# Patient Record
Sex: MALE | Race: WHITE | URBAN - METROPOLITAN AREA
[De-identification: names, ages, dates, MRNs, and addresses within clinical notes are randomized per-mention and may not be internally consistent; named-entity substitution may affect disease eponyms.]

---

## 2017-03-24 ENCOUNTER — APPOINTMENT (OUTPATIENT)
Dept: CT IMAGING | Facility: CLINIC | Age: 47
End: 2017-03-24
Attending: EMERGENCY MEDICINE
Payer: OTHER MISCELLANEOUS

## 2017-03-24 ENCOUNTER — APPOINTMENT (OUTPATIENT)
Dept: GENERAL RADIOLOGY | Facility: CLINIC | Age: 47
End: 2017-03-24
Attending: EMERGENCY MEDICINE
Payer: OTHER MISCELLANEOUS

## 2017-03-24 ENCOUNTER — ANESTHESIA EVENT (OUTPATIENT)
Dept: SURGERY | Facility: CLINIC | Age: 47
End: 2017-03-24
Payer: OTHER MISCELLANEOUS

## 2017-03-24 ENCOUNTER — HOSPITAL ENCOUNTER (OUTPATIENT)
Facility: CLINIC | Age: 47
Setting detail: OBSERVATION
Discharge: HOME | End: 2017-03-25
Attending: EMERGENCY MEDICINE | Admitting: INTERNAL MEDICINE
Payer: OTHER MISCELLANEOUS

## 2017-03-24 ENCOUNTER — APPOINTMENT (OUTPATIENT)
Dept: CT IMAGING | Facility: CLINIC | Age: 47
End: 2017-03-24
Attending: PHYSICIAN ASSISTANT
Payer: OTHER MISCELLANEOUS

## 2017-03-24 ENCOUNTER — ANESTHESIA (OUTPATIENT)
Dept: SURGERY | Facility: CLINIC | Age: 47
End: 2017-03-24
Payer: OTHER MISCELLANEOUS

## 2017-03-24 DIAGNOSIS — S01.512A LACERATION OF ORAL CAVITY, INITIAL ENCOUNTER: ICD-10-CM

## 2017-03-24 DIAGNOSIS — S02.2XXA CLOSED FRACTURE OF NASAL BONE, INITIAL ENCOUNTER: ICD-10-CM

## 2017-03-24 DIAGNOSIS — S01.511A LIP LACERATION, INITIAL ENCOUNTER: ICD-10-CM

## 2017-03-24 DIAGNOSIS — S40.012A CONTUSION OF LEFT SHOULDER, INITIAL ENCOUNTER: ICD-10-CM

## 2017-03-24 PROBLEM — K13.79 MOUTH BLEEDING: Status: ACTIVE | Noted: 2017-03-24

## 2017-03-24 LAB
ANION GAP SERPL CALCULATED.3IONS-SCNC: 6 MMOL/L (ref 3–14)
BUN SERPL-MCNC: 15 MG/DL (ref 7–30)
CALCIUM SERPL-MCNC: 8.6 MG/DL (ref 8.5–10.1)
CHLORIDE SERPL-SCNC: 106 MMOL/L (ref 94–109)
CO2 SERPL-SCNC: 26 MMOL/L (ref 20–32)
CREAT SERPL-MCNC: 0.78 MG/DL (ref 0.66–1.25)
ERYTHROCYTE [DISTWIDTH] IN BLOOD BY AUTOMATED COUNT: 13.1 % (ref 10–15)
GFR SERPL CREATININE-BSD FRML MDRD: ABNORMAL ML/MIN/1.7M2
GLUCOSE SERPL-MCNC: 106 MG/DL (ref 70–99)
HCT VFR BLD AUTO: 44.5 % (ref 40–53)
HGB BLD-MCNC: 15.4 G/DL (ref 13.3–17.7)
MCH RBC QN AUTO: 30.2 PG (ref 26.5–33)
MCHC RBC AUTO-ENTMCNC: 34.6 G/DL (ref 31.5–36.5)
MCV RBC AUTO: 87 FL (ref 78–100)
PLATELET # BLD AUTO: 204 10E9/L (ref 150–450)
POTASSIUM SERPL-SCNC: 4.3 MMOL/L (ref 3.4–5.3)
RBC # BLD AUTO: 5.1 10E12/L (ref 4.4–5.9)
SODIUM SERPL-SCNC: 138 MMOL/L (ref 133–144)
WBC # BLD AUTO: 12.3 10E9/L (ref 4–11)

## 2017-03-24 PROCEDURE — 25000125 ZZHC RX 250: Performed by: NURSE ANESTHETIST, CERTIFIED REGISTERED

## 2017-03-24 PROCEDURE — 27210794 ZZH OR GENERAL SUPPLY STERILE: Performed by: DENTIST

## 2017-03-24 PROCEDURE — 96365 THER/PROPH/DIAG IV INF INIT: CPT

## 2017-03-24 PROCEDURE — 25800025 ZZH RX 258: Performed by: NURSE ANESTHETIST, CERTIFIED REGISTERED

## 2017-03-24 PROCEDURE — 71000012 ZZH RECOVERY PHASE 1 LEVEL 1 FIRST HR: Performed by: DENTIST

## 2017-03-24 PROCEDURE — 80048 BASIC METABOLIC PNL TOTAL CA: CPT | Performed by: EMERGENCY MEDICINE

## 2017-03-24 PROCEDURE — 90471 IMMUNIZATION ADMIN: CPT

## 2017-03-24 PROCEDURE — 36000050 ZZH SURGERY LEVEL 2 1ST 30 MIN: Performed by: DENTIST

## 2017-03-24 PROCEDURE — 70486 CT MAXILLOFACIAL W/O DYE: CPT

## 2017-03-24 PROCEDURE — 73030 X-RAY EXAM OF SHOULDER: CPT | Mod: LT

## 2017-03-24 PROCEDURE — 25000128 H RX IP 250 OP 636: Performed by: EMERGENCY MEDICINE

## 2017-03-24 PROCEDURE — 25000132 ZZH RX MED GY IP 250 OP 250 PS 637: Performed by: PHYSICIAN ASSISTANT

## 2017-03-24 PROCEDURE — G0378 HOSPITAL OBSERVATION PER HR: HCPCS

## 2017-03-24 PROCEDURE — 25000125 ZZHC RX 250: Performed by: ANESTHESIOLOGY

## 2017-03-24 PROCEDURE — 25000128 H RX IP 250 OP 636: Performed by: PHYSICIAN ASSISTANT

## 2017-03-24 PROCEDURE — 25000128 H RX IP 250 OP 636: Performed by: ANESTHESIOLOGY

## 2017-03-24 PROCEDURE — 37000009 ZZH ANESTHESIA TECHNICAL FEE, EACH ADDTL 15 MIN: Performed by: DENTIST

## 2017-03-24 PROCEDURE — 72125 CT NECK SPINE W/O DYE: CPT

## 2017-03-24 PROCEDURE — 85027 COMPLETE CBC AUTOMATED: CPT | Performed by: EMERGENCY MEDICINE

## 2017-03-24 PROCEDURE — 96375 TX/PRO/DX INJ NEW DRUG ADDON: CPT

## 2017-03-24 PROCEDURE — 25000125 ZZHC RX 250: Performed by: EMERGENCY MEDICINE

## 2017-03-24 PROCEDURE — 96361 HYDRATE IV INFUSION ADD-ON: CPT

## 2017-03-24 PROCEDURE — 25000566 ZZH SEVOFLURANE, EA 15 MIN: Performed by: DENTIST

## 2017-03-24 PROCEDURE — 99220 ZZC INITIAL OBSERVATION CARE,LEVL III: CPT | Performed by: PHYSICIAN ASSISTANT

## 2017-03-24 PROCEDURE — 40000169 ZZH STATISTIC PRE-PROCEDURE ASSESSMENT I: Performed by: DENTIST

## 2017-03-24 PROCEDURE — 25000128 H RX IP 250 OP 636: Performed by: NURSE ANESTHETIST, CERTIFIED REGISTERED

## 2017-03-24 PROCEDURE — 71000013 ZZH RECOVERY PHASE 1 LEVEL 1 EA ADDTL HR: Performed by: DENTIST

## 2017-03-24 PROCEDURE — 37000008 ZZH ANESTHESIA TECHNICAL FEE, 1ST 30 MIN: Performed by: DENTIST

## 2017-03-24 PROCEDURE — 70450 CT HEAD/BRAIN W/O DYE: CPT

## 2017-03-24 PROCEDURE — 36000052 ZZH SURGERY LEVEL 2 EA 15 ADDTL MIN: Performed by: DENTIST

## 2017-03-24 PROCEDURE — 96376 TX/PRO/DX INJ SAME DRUG ADON: CPT

## 2017-03-24 PROCEDURE — 99285 EMERGENCY DEPT VISIT HI MDM: CPT | Mod: 25

## 2017-03-24 PROCEDURE — 25000125 ZZHC RX 250: Performed by: DENTIST

## 2017-03-24 PROCEDURE — 90715 TDAP VACCINE 7 YRS/> IM: CPT | Performed by: EMERGENCY MEDICINE

## 2017-03-24 RX ORDER — FENTANYL CITRATE 50 UG/ML
INJECTION, SOLUTION INTRAMUSCULAR; INTRAVENOUS PRN
Status: DISCONTINUED | OUTPATIENT
Start: 2017-03-24 | End: 2017-03-24

## 2017-03-24 RX ORDER — OXYCODONE HYDROCHLORIDE 5 MG/1
5-10 TABLET ORAL
Status: CANCELLED | OUTPATIENT
Start: 2017-03-24

## 2017-03-24 RX ORDER — SODIUM CHLORIDE 9 MG/ML
INJECTION, SOLUTION INTRAVENOUS ONCE
Status: COMPLETED | OUTPATIENT
Start: 2017-03-24 | End: 2017-03-24

## 2017-03-24 RX ORDER — CEFAZOLIN SODIUM 1 G/3ML
1 INJECTION, POWDER, FOR SOLUTION INTRAMUSCULAR; INTRAVENOUS EVERY 8 HOURS
Status: DISCONTINUED | OUTPATIENT
Start: 2017-03-24 | End: 2017-03-25 | Stop reason: HOSPADM

## 2017-03-24 RX ORDER — ONDANSETRON 4 MG/1
4 TABLET, ORALLY DISINTEGRATING ORAL EVERY 30 MIN PRN
Status: DISCONTINUED | OUTPATIENT
Start: 2017-03-24 | End: 2017-03-24 | Stop reason: HOSPADM

## 2017-03-24 RX ORDER — ACETAMINOPHEN 650 MG/1
650 SUPPOSITORY RECTAL EVERY 4 HOURS PRN
Status: DISCONTINUED | OUTPATIENT
Start: 2017-03-24 | End: 2017-03-25 | Stop reason: HOSPADM

## 2017-03-24 RX ORDER — FENTANYL CITRATE 0.05 MG/ML
25-50 INJECTION, SOLUTION INTRAMUSCULAR; INTRAVENOUS
Status: DISCONTINUED | OUTPATIENT
Start: 2017-03-24 | End: 2017-03-24 | Stop reason: HOSPADM

## 2017-03-24 RX ORDER — MEPERIDINE HYDROCHLORIDE 25 MG/ML
12.5 INJECTION INTRAMUSCULAR; INTRAVENOUS; SUBCUTANEOUS EVERY 5 MIN PRN
Status: DISCONTINUED | OUTPATIENT
Start: 2017-03-24 | End: 2017-03-24 | Stop reason: HOSPADM

## 2017-03-24 RX ORDER — LIDOCAINE HYDROCHLORIDE AND EPINEPHRINE BITARTRATE 20; .01 MG/ML; MG/ML
INJECTION, SOLUTION SUBCUTANEOUS PRN
Status: DISCONTINUED | OUTPATIENT
Start: 2017-03-24 | End: 2017-03-24 | Stop reason: HOSPADM

## 2017-03-24 RX ORDER — TRAMADOL HYDROCHLORIDE 50 MG/1
50 TABLET ORAL EVERY 6 HOURS PRN
Status: DISCONTINUED | OUTPATIENT
Start: 2017-03-24 | End: 2017-03-25 | Stop reason: HOSPADM

## 2017-03-24 RX ORDER — HYDROMORPHONE HYDROCHLORIDE 1 MG/ML
0.2 INJECTION, SOLUTION INTRAMUSCULAR; INTRAVENOUS; SUBCUTANEOUS
Status: DISCONTINUED | OUTPATIENT
Start: 2017-03-24 | End: 2017-03-25 | Stop reason: HOSPADM

## 2017-03-24 RX ORDER — DEXAMETHASONE SODIUM PHOSPHATE 4 MG/ML
INJECTION, SOLUTION INTRA-ARTICULAR; INTRALESIONAL; INTRAMUSCULAR; INTRAVENOUS; SOFT TISSUE PRN
Status: DISCONTINUED | OUTPATIENT
Start: 2017-03-24 | End: 2017-03-24

## 2017-03-24 RX ORDER — LIDOCAINE HYDROCHLORIDE 20 MG/ML
INJECTION, SOLUTION INFILTRATION; PERINEURAL PRN
Status: DISCONTINUED | OUTPATIENT
Start: 2017-03-24 | End: 2017-03-24

## 2017-03-24 RX ORDER — ACETAMINOPHEN 325 MG/1
650 TABLET ORAL EVERY 4 HOURS PRN
Status: DISCONTINUED | OUTPATIENT
Start: 2017-03-24 | End: 2017-03-25 | Stop reason: HOSPADM

## 2017-03-24 RX ORDER — ONDANSETRON 2 MG/ML
4 INJECTION INTRAMUSCULAR; INTRAVENOUS EVERY 30 MIN PRN
Status: DISCONTINUED | OUTPATIENT
Start: 2017-03-24 | End: 2017-03-24 | Stop reason: HOSPADM

## 2017-03-24 RX ORDER — PROPOFOL 10 MG/ML
INJECTION, EMULSION INTRAVENOUS PRN
Status: DISCONTINUED | OUTPATIENT
Start: 2017-03-24 | End: 2017-03-24

## 2017-03-24 RX ORDER — ONDANSETRON 4 MG/1
4 TABLET, ORALLY DISINTEGRATING ORAL EVERY 6 HOURS PRN
Status: DISCONTINUED | OUTPATIENT
Start: 2017-03-24 | End: 2017-03-25 | Stop reason: HOSPADM

## 2017-03-24 RX ORDER — SODIUM CHLORIDE 9 MG/ML
INJECTION, SOLUTION INTRAVENOUS CONTINUOUS
Status: DISCONTINUED | OUTPATIENT
Start: 2017-03-24 | End: 2017-03-25 | Stop reason: HOSPADM

## 2017-03-24 RX ORDER — ONDANSETRON 2 MG/ML
4 INJECTION INTRAMUSCULAR; INTRAVENOUS EVERY 6 HOURS PRN
Status: DISCONTINUED | OUTPATIENT
Start: 2017-03-24 | End: 2017-03-25 | Stop reason: HOSPADM

## 2017-03-24 RX ORDER — NALOXONE HYDROCHLORIDE 0.4 MG/ML
.1-.4 INJECTION, SOLUTION INTRAMUSCULAR; INTRAVENOUS; SUBCUTANEOUS
Status: DISCONTINUED | OUTPATIENT
Start: 2017-03-24 | End: 2017-03-25 | Stop reason: HOSPADM

## 2017-03-24 RX ORDER — BUPIVACAINE HYDROCHLORIDE AND EPINEPHRINE 5; 5 MG/ML; UG/ML
INJECTION, SOLUTION PERINEURAL PRN
Status: DISCONTINUED | OUTPATIENT
Start: 2017-03-24 | End: 2017-03-24 | Stop reason: HOSPADM

## 2017-03-24 RX ORDER — SODIUM CHLORIDE, SODIUM LACTATE, POTASSIUM CHLORIDE, CALCIUM CHLORIDE 600; 310; 30; 20 MG/100ML; MG/100ML; MG/100ML; MG/100ML
INJECTION, SOLUTION INTRAVENOUS CONTINUOUS
Status: DISCONTINUED | OUTPATIENT
Start: 2017-03-24 | End: 2017-03-24

## 2017-03-24 RX ORDER — EPHEDRINE SULFATE 50 MG/ML
INJECTION, SOLUTION INTRAMUSCULAR; INTRAVENOUS; SUBCUTANEOUS PRN
Status: DISCONTINUED | OUTPATIENT
Start: 2017-03-24 | End: 2017-03-24

## 2017-03-24 RX ORDER — SODIUM CHLORIDE, SODIUM LACTATE, POTASSIUM CHLORIDE, CALCIUM CHLORIDE 600; 310; 30; 20 MG/100ML; MG/100ML; MG/100ML; MG/100ML
INJECTION, SOLUTION INTRAVENOUS CONTINUOUS PRN
Status: DISCONTINUED | OUTPATIENT
Start: 2017-03-24 | End: 2017-03-24

## 2017-03-24 RX ORDER — HYDROMORPHONE HYDROCHLORIDE 1 MG/ML
0.5 INJECTION, SOLUTION INTRAMUSCULAR; INTRAVENOUS; SUBCUTANEOUS
Status: DISCONTINUED | OUTPATIENT
Start: 2017-03-24 | End: 2017-03-24

## 2017-03-24 RX ORDER — CEFAZOLIN SODIUM 1 G/3ML
1 INJECTION, POWDER, FOR SOLUTION INTRAMUSCULAR; INTRAVENOUS ONCE
Status: COMPLETED | OUTPATIENT
Start: 2017-03-24 | End: 2017-03-24

## 2017-03-24 RX ORDER — ONDANSETRON 2 MG/ML
INJECTION INTRAMUSCULAR; INTRAVENOUS PRN
Status: DISCONTINUED | OUTPATIENT
Start: 2017-03-24 | End: 2017-03-24

## 2017-03-24 RX ORDER — CYCLOBENZAPRINE HCL 10 MG
10 TABLET ORAL 3 TIMES DAILY PRN
Status: DISCONTINUED | OUTPATIENT
Start: 2017-03-24 | End: 2017-03-25 | Stop reason: HOSPADM

## 2017-03-24 RX ORDER — MORPHINE SULFATE 4 MG/ML
4 INJECTION, SOLUTION INTRAMUSCULAR; INTRAVENOUS ONCE
Status: COMPLETED | OUTPATIENT
Start: 2017-03-24 | End: 2017-03-24

## 2017-03-24 RX ADMIN — CEFAZOLIN SODIUM 1 G: 1 INJECTION, POWDER, FOR SOLUTION INTRAMUSCULAR; INTRAVENOUS at 21:36

## 2017-03-24 RX ADMIN — HYDROMORPHONE HYDROCHLORIDE 0.5 MG: 1 INJECTION, SOLUTION INTRAMUSCULAR; INTRAVENOUS; SUBCUTANEOUS at 12:29

## 2017-03-24 RX ADMIN — CLOSTRIDIUM TETANI TOXOID ANTIGEN (FORMALDEHYDE INACTIVATED), CORYNEBACTERIUM DIPHTHERIAE TOXOID ANTIGEN (FORMALDEHYDE INACTIVATED), BORDETELLA PERTUSSIS TOXOID ANTIGEN (GLUTARALDEHYDE INACTIVATED), BORDETELLA PERTUSSIS FILAMENTOUS HEMAGGLUTININ ANTIGEN (FORMALDEHYDE INACTIVATED), BORDETELLA PERTUSSIS PERTACTIN ANTIGEN, AND BORDETELLA PERTUSSIS FIMBRIAE 2/3 ANTIGEN 0.5 ML: 5; 2; 2.5; 5; 3; 5 INJECTION, SUSPENSION INTRAMUSCULAR at 12:27

## 2017-03-24 RX ADMIN — SUCCINYLCHOLINE CHLORIDE 100 MG: 20 INJECTION, SOLUTION INTRAMUSCULAR; INTRAVENOUS at 16:32

## 2017-03-24 RX ADMIN — LIDOCAINE HYDROCHLORIDE 100 MG: 20 INJECTION, SOLUTION INFILTRATION; PERINEURAL at 16:32

## 2017-03-24 RX ADMIN — HYDROMORPHONE HYDROCHLORIDE 0.5 MG: 1 INJECTION, SOLUTION INTRAMUSCULAR; INTRAVENOUS; SUBCUTANEOUS at 11:36

## 2017-03-24 RX ADMIN — FENTANYL CITRATE 50 MCG: 50 INJECTION, SOLUTION INTRAMUSCULAR; INTRAVENOUS at 19:16

## 2017-03-24 RX ADMIN — HYDROMORPHONE HYDROCHLORIDE 0.2 MG: 1 INJECTION, SOLUTION INTRAMUSCULAR; INTRAVENOUS; SUBCUTANEOUS at 21:34

## 2017-03-24 RX ADMIN — PHENYLEPHRINE HYDROCHLORIDE 100 MCG: 10 INJECTION, SOLUTION INTRAMUSCULAR; INTRAVENOUS; SUBCUTANEOUS at 17:09

## 2017-03-24 RX ADMIN — MIDAZOLAM HYDROCHLORIDE 2 MG: 1 INJECTION, SOLUTION INTRAMUSCULAR; INTRAVENOUS at 16:29

## 2017-03-24 RX ADMIN — HYDROMORPHONE HYDROCHLORIDE 0.5 MG: 1 INJECTION, SOLUTION INTRAMUSCULAR; INTRAVENOUS; SUBCUTANEOUS at 19:16

## 2017-03-24 RX ADMIN — FENTANYL CITRATE 100 MCG: 50 INJECTION, SOLUTION INTRAMUSCULAR; INTRAVENOUS at 16:32

## 2017-03-24 RX ADMIN — ACETAMINOPHEN 650 MG: 325 TABLET, FILM COATED ORAL at 21:35

## 2017-03-24 RX ADMIN — PHENYLEPHRINE HYDROCHLORIDE 100 MCG: 10 INJECTION, SOLUTION INTRAMUSCULAR; INTRAVENOUS; SUBCUTANEOUS at 17:00

## 2017-03-24 RX ADMIN — DEXAMETHASONE SODIUM PHOSPHATE 4 MG: 4 INJECTION, SOLUTION INTRAMUSCULAR; INTRAVENOUS at 16:45

## 2017-03-24 RX ADMIN — Medication 5 MG: at 17:09

## 2017-03-24 RX ADMIN — PHENYLEPHRINE HYDROCHLORIDE 100 MCG: 10 INJECTION, SOLUTION INTRAMUSCULAR; INTRAVENOUS; SUBCUTANEOUS at 16:55

## 2017-03-24 RX ADMIN — SODIUM CHLORIDE: 9 INJECTION, SOLUTION INTRAVENOUS at 11:32

## 2017-03-24 RX ADMIN — MORPHINE SULFATE 4 MG: 4 INJECTION, SOLUTION INTRAMUSCULAR; INTRAVENOUS at 13:05

## 2017-03-24 RX ADMIN — FENTANYL CITRATE 150 MCG: 50 INJECTION, SOLUTION INTRAMUSCULAR; INTRAVENOUS at 16:49

## 2017-03-24 RX ADMIN — PHENYLEPHRINE HYDROCHLORIDE 100 MCG: 10 INJECTION, SOLUTION INTRAMUSCULAR; INTRAVENOUS; SUBCUTANEOUS at 17:06

## 2017-03-24 RX ADMIN — Medication 5 MG: at 17:22

## 2017-03-24 RX ADMIN — SODIUM CHLORIDE, POTASSIUM CHLORIDE, SODIUM LACTATE AND CALCIUM CHLORIDE: 600; 310; 30; 20 INJECTION, SOLUTION INTRAVENOUS at 17:08

## 2017-03-24 RX ADMIN — TRAMADOL HYDROCHLORIDE 50 MG: 50 TABLET, COATED ORAL at 20:22

## 2017-03-24 RX ADMIN — ONDANSETRON 4 MG: 2 INJECTION INTRAMUSCULAR; INTRAVENOUS at 16:45

## 2017-03-24 RX ADMIN — CEFAZOLIN SODIUM 1 G: 1 INJECTION, POWDER, FOR SOLUTION INTRAMUSCULAR; INTRAVENOUS at 12:27

## 2017-03-24 RX ADMIN — PHENYLEPHRINE HYDROCHLORIDE 100 MCG: 10 INJECTION, SOLUTION INTRAMUSCULAR; INTRAVENOUS; SUBCUTANEOUS at 17:16

## 2017-03-24 RX ADMIN — CYCLOBENZAPRINE HYDROCHLORIDE 10 MG: 10 TABLET, FILM COATED ORAL at 21:35

## 2017-03-24 RX ADMIN — PHENYLEPHRINE HYDROCHLORIDE 100 MCG: 10 INJECTION, SOLUTION INTRAMUSCULAR; INTRAVENOUS; SUBCUTANEOUS at 17:22

## 2017-03-24 RX ADMIN — Medication 5 MG: at 17:16

## 2017-03-24 RX ADMIN — SODIUM CHLORIDE, POTASSIUM CHLORIDE, SODIUM LACTATE AND CALCIUM CHLORIDE: 600; 310; 30; 20 INJECTION, SOLUTION INTRAVENOUS at 15:54

## 2017-03-24 RX ADMIN — PROPOFOL 200 MG: 10 INJECTION, EMULSION INTRAVENOUS at 16:32

## 2017-03-24 RX ADMIN — PHENYLEPHRINE HYDROCHLORIDE 100 MCG: 10 INJECTION, SOLUTION INTRAMUSCULAR; INTRAVENOUS; SUBCUTANEOUS at 17:04

## 2017-03-24 ASSESSMENT — ENCOUNTER SYMPTOMS
NUMBNESS: 0
ABDOMINAL PAIN: 0
NECK PAIN: 1
HEADACHES: 0
WOUND: 1

## 2017-03-24 NOTE — IP AVS SNAPSHOT
[facility] Observation Unit    [address] [place] [id number]    Phone:  [phone]                                       After Visit Summary   3/24/2017    Patrick Cloud    MRN: [id number]           After Visit Summary Signature Page     I have received my discharge instructions, and my questions have been answered. I have discussed any challenges I see with this plan with the nurse or doctor.    ..........................................................................................................................................  Patient/Patient Representative Signature      ..........................................................................................................................................  Patient Representative Print Name and Relationship to Patient    ..................................................               ................................................  Date                                            Time    ..........................................................................................................................................  Reviewed by Signature/Title    ...................................................              ..............................................  Date                                                            Time

## 2017-03-24 NOTE — ANESTHESIA CARE TRANSFER NOTE
Patient: Patrick Cloud    Procedure(s):  REPAIR OF LIP LACERATIONS AND FACIAL LACERATIONS; EXAM UNDER ANESTHESIA ORAL - Wound Class: II-Clean Contaminated   - Wound Class: II-Clean Contaminated    Diagnosis: Facial lacerations  Diagnosis Additional Information: No value filed.    Anesthesia Type:   General, ETT, RSI     Note:  Airway :Face Mask  Patient transferred to:PACU  Comments: Transferred to PACU, spontaneous respirations, 10L oxygen via facemask.  All monitors and alarms on and functioning, VSS.  Patient awake, comfortable.  Report to PACU RN.      Vitals: (Last set prior to Anesthesia Care Transfer)    CRNA VITALS  3/24/2017 1737 - 3/24/2017 1812      3/24/2017             Pulse: 103    SpO2: 99 %    Resp Rate (set): 10                Electronically Signed By: CUCO Frank CRNA  March 24, 2017  6:12 PM

## 2017-03-24 NOTE — ANESTHESIA PREPROCEDURE EVALUATION
Anesthesia Evaluation     . Pt has not had prior anesthetic            ROS/MED HX    ENT/Pulmonary:     (+)other ENT- facial laceration. Most severe on interior of lower lip. Nasal fracture. No mandibular fracture. Minimal swelling, , . .   (-) sleep apnea   Neurologic:  - neg neurologic ROS     Cardiovascular:  - neg cardiovascular ROS       METS/Exercise Tolerance:     Hematologic:  - neg hematologic  ROS       Musculoskeletal:  - neg musculoskeletal ROS       GI/Hepatic:        (-) GERD   Renal/Genitourinary:  - ROS Renal section negative       Endo:  - neg endo ROS       Psychiatric:  - neg psychiatric ROS       Infectious Disease:         Malignancy:         Other:                     Physical Exam  Normal systems: cardiovascular, pulmonary and dental    Airway   Mallampati: II  TM distance: >3 FB  Neck ROM: full    Dental     Cardiovascular       Pulmonary                     Anesthesia Plan      History & Physical Review  History and physical reviewed and following examination; no interval change.    ASA Status:  1 emergent.    NPO Status:  > 8 hours    Plan for General, ETT and RSI with Intravenous induction. Maintenance will be Balanced.    PONV prophylaxis:  Ondansetron (or other 5HT-3) and Dexamethasone or Solumedrol  Additional equipment: Videolaryngoscope      Postoperative Care  Postoperative pain management:  IV analgesics.      Consents  Anesthetic plan, risks, benefits and alternatives discussed with:  Patient..            Procedure: Procedure(s):  REPAIR LACERATION FACE  Preop diagnosis: Facial lacerations    No Known Allergies  History reviewed. No pertinent past medical history.  History reviewed. No pertinent surgical history.  Prior to Admission medications    Not on File     Current Facility-Administered Medications Ordered in Epic   Medication Dose Route Frequency Last Rate Last Dose     [Auto Hold] HYDROmorphone (PF) (DILAUDID) injection 0.5 mg  0.5 mg Intravenous Q15 Min PRN   0.5 mg at  03/24/17 1229     No current [facility]-ordered outpatient prescriptions on file.     Wt Readings from Last 1 Encounters:   03/24/17 78.7 kg (173 lb 6.4 oz)     Temp Readings from Last 1 Encounters:   03/24/17 37.5  C (99.5  F) (Temporal)     BP Readings from Last 6 Encounters:   03/24/17 127/80     Pulse Readings from Last 4 Encounters:   No data found for Pulse     Resp Readings from Last 1 Encounters:   03/24/17 16     SpO2 Readings from Last 1 Encounters:   03/24/17 92%     Recent Labs   Lab Test  03/24/17   1130   NA  138   POTASSIUM  4.3   CHLORIDE  106   CO2  26   ANIONGAP  6   GLC  106*   BUN  15   CR  0.78   DANIAL  8.6     Recent Labs   Lab Test  03/24/17   1130   WBC  12.3*   HGB  15.4   PLT  204     No results for input(s): INR in the last 92288 hours.    Invalid input(s): APTT   RECENT LABS:   ECG:   ECHO:   CXR:                  .

## 2017-03-24 NOTE — ED PROVIDER NOTES
"  History     Chief Complaint:  Facial pain    HPI   Patrick Cloud is a 47 year old male who presents with facial pain. This morning, the patient was working when a 3/4 inch piece of plywood fell 10 feet and hit him in the head, face, and left shoulder. The patient did not fall or lose consciousness and he was wearing a hardhat during the incident. The patient was able to ambulate after the incident. The patient presents with abrasions to his face, laceration to his lower right lip and nose, nasal swelling, left shoulder pain, jaw pain, and neck pain. No headache. No numbness or tingling. No vision changes. No dental problems. No abdominal pain. The patient last ate a 0430 this morning.    Allergies:  No known drug allergies.     Medications:    The patient is currently on no regular medications.     Past Medical History:    History reviewed.  No significant past medical history.     Past Surgical History:    History reviewed. No pertinent past surgical history.    Family History:    History reviewed. No pertinent family history.    Social History:  Presents to the ED alone  Tobacco Use: negative  Alcohol Use: negative     Review of Systems   HENT: Negative for dental problem.    Gastrointestinal: Negative for abdominal pain.   Musculoskeletal: Positive for neck pain.        Positive for left shoulder pain  Positive for nose swelling  Positive for jaw pain   Skin: Positive for wound.   Neurological: Negative for numbness and headaches.   All other systems reviewed and are negative.    Physical Exam   First Vitals:  BP: 130/59  Heart Rate: 70  Temp: 98.1  F (36.7  C)  Resp: 14  Height: 172.7 cm (5' 8\")  Weight: 77.1 kg (170 lb)  SpO2: 98 %    Physical Exam  Nursing note and vitals reviewed.  Constitutional:  Appears well-developed and well-nourished, comfortable.   HENT:     Head:   A small abrasion and contusion on his forehead. No hemotympanum  Nose:    Markedly swollen nose with diffuse tenderness in the nose. No " septal hematoma but dried blood in both nares and swelling . Laceration under the right nares in the upper lip approximately 2 cm  Mouth/Throat:  Mucosa is moist. Right half of the lower lip there is a gaping laceration that extends down towards his chin. Lower lip laceration separates the gingival labial junction, with a lot of gray debris in wound. Right half of upper lip small laceration  Eyes:    Conjunctivae are normal.      Pupils are equal, round, and reactive to light.      Right eye exhibits no discharge. Left eye exhibits no discharge.      No scleral icterus.   Cardiovascular:  Normal rate, regular rhythm.      Normal heart sounds and intact distal pulses.       No murmur heard.  Pulmonary/Chest:  Effort normal and breath sounds normal. No respiratory distress.     No wheezes. No rales. No chest wall tenderness. No stridor.   Abdominal:   Soft. No distension and no mass. No tenderness.      No rebound and no guarding. No flank pain.  Musculoskeletal:  Normal range of motion.      No edema and no tenderness.                                       Neck supple, no midline cervical tenderness.   Neurological:   Alert and oriented to person, place, and year.      No cranial nerve deficit.      Exhibits normal muscle tone. Coordination normal.      GCS eye subscore is 4. GCS verbal subscore is 5.      GCS motor subscore is 6.   Skin:    Skin is warm and dry. No rash noted. No diaphoresis.      No erythema. No pallor.   Psychiatric:   Behavior is normal. Judgment and thought content normal.     Emergency Department Course   Imaging:  Radiographic findings were communicated with the patient who voiced understanding of the findings.    Maxillofacial CT w/o contrast  Moderately displaced fractures of the nasal bone  bilaterally and adjacent anterosuperior nasal septum.  Report per radiology.    Shoulder XR G/E 3 views, left  Negative.  Preliminary radiology read.      Head CT w/o contrast  Negative CT scan of the  head.  Report per radiology.    Laboratory:  BMP: glucose 106 (H), otherwise WNL (Creatinine 0.78)  CBC:  WBC 12.3 (H), HGB 15.4, , otherwise WNL    Interventions:  (1229) Dilaudid, 0.5 mg, IV injection  (1305) Normal Saline, 125 mL/hr, IV bolus  (1306) Ancef, 1 g, IV  (1227) Tdap, 0.5 mLs, IM  (1305) Morphine, 4 mg, IV injection    The patient's symptoms were improved with parenteral narcotics.    Emergency Department Course:  Nursing notes and vitals reviewed.  I performed an exam of the patient as documented above.  A peripheral IV was established. Blood was drawn from the patient. This was sent for laboratory testing, findings above.   The patient was sent for a maxillofacial CT, shoulder x-ray, and head CT while in the emergency department, findings above.   Findings and plan explained to the patient who consents to admission.   (1245) I discussed the patient with Dr. Weldon of oral surgery, who will admit the patient to a observation bed for further monitoring, evaluation, and treatment.  (1247) I spoke with Dr. Freitas, the patient's workers comp physician, about the patient.    Impression & Plan    Medical Decision Making:  Patient comes in after a piece of plywood fell on him. He was not knocked out, he was wearing his hardhat. He has a swollen nose, but no evidence of septal hematoma. Laceration to the upper and lower lip and a significant laceration inside of the lower lip that pulled the gingiva away from the lower lip. There is a lot of debris down in this wound. It looks like white powder, almost like concrete powder. He is awake and alert. He is appropriate. No neck tenderness. His shoulder was sore so I got an x-ray which was normal. Head CT and maxillofacial CT show a nasal bone fracture, but no mandibular fracture and his head CT was normal. I talked with Dr. Weldon from oral surgery and this patient needs to go to the OR to get this wound all cleaned out in his mouth and repaired. The other lip  laceration can be repaired in the OR as well. He was cleaned up as best as I could some wet saline gauze was placed over the wounds and ice pack to the nose. He was given a gram of Ancef IV, a tetanus booster, and IV fluids. Dilaudid was used initially for pain and then morphine. He will be kept NPO, last meal was 4:30 this morning. The patient is very healthy. We will admit him up on observation until he can go to the operating room in the next 4-5 hours. We will continue IV fluids. I will let him do some saline mouth rinses as needed. Incidentally, the work comp doctor did get permission for me to talk with him so I did give him my findings on the phone with regards to the lip laceration and the nasal fracture.    Diagnosis:    ICD-10-CM    1. Laceration of oral cavity, initial encounter S01.512A     Lower inner lip   2. Lip laceration, initial encounter S01.511A    3. Closed fracture of nasal bone, initial encounter S02.2XXA    4. Contusion of left shoulder, initial encounter S40.012A        Disposition:  Admit observation to Dr. Weldon, plan is surgical debridement and repair later this afternoon. Whether he stays overnight or not will depend upon Dr. Weldon s evaluation. Regarding the nasal fracture, Dr. Weldon can review that to see if anything needs to be done while he is asleep. At this point, the patient is comfortable; he will be kept NPO, IV fluids, pain management. He received Ancef here in the emergency room.     I, German Jack, am serving as a scribe on 3/24/2017 at 10:51 AM to personally document services performed by Dr. Caldera based on my observations and the provider's statements to me.        Dacia Caldera MD  03/24/17 1499

## 2017-03-24 NOTE — ED NOTES
"[facility]  ED Nurse Handoff Report    ED Chief complaint: Facial Pain (Plywood fell from ceiling onto head/face. Nasal injury, neck stiffness, jaw pain and left shoulder pain. )      ED Diagnosis:   Final diagnoses:   Laceration of oral cavity, initial encounter - Lower inner lip   Lip laceration, initial encounter   Closed fracture of nasal bone, initial encounter   Contusion of left shoulder, initial encounter       Code Status: Full Code    Allergies: No Known Allergies    Activity level:  Stand with Assist     Needed?: No    Isolation: No  Infection: Not Applicable    Bariatric?: No      Vital Signs:   Vitals:    03/24/17 1049 03/24/17 1245   BP: 130/59 123/66   Resp: 14    Temp: 98.1  F (36.7  C)    TempSrc: Oral    SpO2: 98% 95%   Weight: 77.1 kg (170 lb)    Height: 1.727 m (5' 8\")        Cardiac Rhythm: ,        Pain level: 0-10 Pain Scale: 5    Is this patient confused?: No    Patient Report: Initial Complaint: a piece of plywood fell on patients face causing several lacerations and pain in jaw, nose and left shoulder  Focused Assessment: Patient came in after accident at work. He has a large lip laceration, abrasion and slight swelling of left shoulder, nose is swollen and a small laceration of forehead at hairline. Inner lower lip is embedded with shards of concrete and will need surgical wash out and repair.   Tests Performed: jaw and head CT, left shoulder xray  Abnormal Results: nose is fractured  Treatments provided: pain meds, IV antibiotic and IV fluids    Family Comments: wife at bedside    OBS brochure/video discussed/provided to patient: Yes    ED Medications:   Medications   HYDROmorphone (PF) (DILAUDID) injection 0.5 mg (0.5 mg Intravenous Given 3/24/17 1229)   ceFAZolin (ANCEF) 1 g vial to attach to  ml bag for ADULT or 50 ml bag for PEDS (0 g Intravenous Stopped 3/24/17 1306)   0.9% sodium chloride infusion ( Intravenous Stopped 3/24/17 1305)   Tdap " (tetanus-diphtheria-acell pertussis) (ADACEL) injection 0.5 mL (0.5 mLs Intramuscular Given 3/24/17 1227)   morphine (PF) injection 4 mg (4 mg Intravenous Given 3/24/17 1305)       Drips infusing?:  No      ED NURSE PHONE NUMBER: [phone]

## 2017-03-24 NOTE — PHARMACY-ADMISSION MEDICATION HISTORY
Admission medication history interview status for the 3/24/2017  admission is complete. See EPIC admission navigator for prior to admission medications     Medication history source reliability:Good    Actions taken by pharmacist (provider contacted, etc): interviewed patient     Additional medication history information not noted on PTA med list : is on NO home meds, prescription, otc, herbal    Medication reconciliation/reorder completed by provider prior to medication history? No    Time spent in this activity: 5 min    Prior to Admission medications    Not on File

## 2017-03-24 NOTE — IP AVS SNAPSHOT
MRN:[id number]                      After Visit Summary   3/24/2017    Patrick Cloud    MRN: [id number]           Thank you!     Thank you for choosing [facility] for your care. Our goal is always to provide you with excellent care. Hearing back from our patients is one way we can continue to improve our services. Please take a few minutes to complete the written survey that you may receive in the mail after you visit with us. Thank you!        Patient Information     Date Of Birth          1970        About your hospital stay     You were admitted on:  March 24, 2017 You last received care in the:  [facility] Observation [facility]    You were discharged on:  March 25, 2017        Reason for your hospital stay       You were registered to observation following a work related injury.                  Who to Call     For medical emergencies, please call 911.  For non-urgent questions about your medical care, please call your primary care provider or clinic, None  For questions related to your surgery, please call your surgery clinic        Attending Provider     Provider Specialty    Dacia Caldera MD Emergency Medicine    Gualberto Weldon DDS Oral Surgery    Miroslava Syed DO Internal Medicine       Primary Care Provider    [facility]       [place]        After Care Instructions     Activity       Your activity upon discharge: activity as tolerated; no driving while on Narcotics.  You can not return to work until Wednesday 3/29/2017.            Diet       Follow this diet upon discharge:  Full liquid diet for 2-3 days then start mechanical soft diet                  Follow-up Appointments     Follow-up and recommended labs and tests        Follow up with primary care provider, [facility], within 7 days to evaluate medication change and for hospital follow-up and to set up referral for ENT.  No follow up labs or test are needed.  Follow up with oral  maxillary facial surgery per their recommendations.                  Further instructions from your care team         Full Liquid Diet  A full liquid diet is a middle step between a clear liquid diet and eating solid foods. A clear liquid diet allows only liquid you can see through. A full liquid diet allows thicker, liquid foods as listed below. The full liquid diet may be used before or after surgery or before some medical tests. It is easy to digest and leaves little food in the stomach and intestines.  A full liquid diet meets calorie and protein needs for your body with liquids only. It should not be used for more than 5 days. Your doctor or dietitian may also order high-protein, high-calorie liquid supplements for extra vitamins and minerals. You may include the following items on a full liquid diet:    Adults  Adults should drink a total of 2 to 3 quarts of liquid per day. It may be easier to drink small, frequent servings rather than a few large ones. Patients with severe kidney or heart disease can drink only limited amounts of fluids. Check with your doctor.    Beverages. Coffee, tea, cream, milk, milkshakes, fruit and vegetable juices, sodas, mineral water (plain or flavored), liquid gelatin, electrolyte replacement sport drinks.    Cereals and soups. Creamy, hot breakfast cereals (wheat or rice), pureed soups (including pureed meats, bland vegetables, and white potatoes), tomato puree.    Desserts. Gelatin, whipped topping, custard-style yogurt, pudding, custard, plain ice cream, sherbet, sorbet, popsicles, fruit juice bars.    Miscellaneous. Salt, mild-flavored seasonings, chocolate flavoring, gravy, margarine, sugar, syrup, jelly, honey, hard candy (to suck on).  Children  Follow your health care provider s instructions. Young children who are eating solid foods may tolerate the items listed above. Here are some precautions:    Don't give hard candies to young children. The candies may cause  choking.    Children under 1 year old. Do not give honey. It may cause illness.    Children under 2 years old. Ask the doctor if you should supplement your child s diet with oral rehydration solutions, which contain electrolytes. Oral rehydration solution is available at drugstores and grocery stores without a prescription.    Children over 1 year old. Limit milk to 3 or 4 cups per day. Too much milk can decrease your child s appetite for other foods.    9195-7612 [employer]. [address] [place] [address]. All rights reserved. This information is not intended as a substitute for professional medical care. Always follow your healthcare professional's instructions.        Soft Diet  Your health care provider has prescribed a soft diet (also called gastrointestinal soft diet). This means eating foods that are soft in texture, low in fiber, and easily digested. This diet is for people with digestive problems. A soft diet provides foods that are easy to chew and swallow. Foods should be bite-size and very soft or moist so they are easy to swallow. Follow any specific instructions from your health care provider about foods and beverages you can and cannot have. The general guidelines below can help you get started on this diet.       Beverages  OK: Milk, tea, coffee, fruit juices, carbonated beverages, nutrition shakes and drinks (Note: Thin liquids might be difficult to swallow and may require thickening.)   Avoid: None, except if they need to be thickened  Breads and crackers  OK: Refined white, wheat, or seedless rye bread, maik or soda crackers that have been moistened, plain rolls or bagels, very soft tortillas  Avoid: Whole-grain breads, rolls, or bagels with nuts, raisins, or seeds, crackers, croutons, taco shells  Cereals and grains  OK: Cooked cereals, plain dry cereals that have been moistened, plain macaroni, spaghetti, noodles, rice  Avoid: Whole-grain cereals and granola, or  cereals containing bran, raisins, seeds or nuts, coconut; brown or wild rice  Fruits  OK: Avocado, banana, baked peeled apple, applesauce, peeled ripe peaches or pears, canned fruit (apricots, cherries, peaches, pears), melons  Avoid: Raw apple, dried fruits, coconut, pineapple, grapes, fruit ilzzie, fruit snacks  Meat and fish  OK: All fresh meat, poultry, or fish that is cooked until tender  Avoid: Meat, fish, or poultry that is fried; tough or stringy meat including de león, sausage, bratwurst, jerky, corned beef  Eggs and cheese  OK: Poached or scrambled eggs, cottage cheese, ricotta cheese, cream cheese, cheese sauces, or cheese melted in other dishes  Avoid: Crisp fried eggs, cheese slices and cubes  Other protein foods  OK: Tofu, baked beans, smooth peanut butter or other nut or seed butters  Avoid: Deep-fried tofu, crunchy peanut or other nut or seed butters, nuts or seeds that are whole or chopped  Soups  OK: All soups, but they may need to be thickened if the thin liquid is too difficult to swallow  Avoid: Soups made with stringy meat pieces or chunky vegetables  Vegetables  OK: Peeled and well-cooked potatoes or sweet potatoes; fresh, cooked, canned, or frozen vegetables without seeds, skin, or coarse fiber  Avoid: Raw vegetables, deep-fried vegetables (such as tempura); corn  Desserts and sweets  OK: Moist cake, soft fruit pie with bottom crust only, soft cookies moistened in milk or other liquid, gelatin, custard, pudding, plain ice cream, plain sherbet, sugar, honey, clear jelly  Avoid: Pastries, desserts, and ice cream that have nuts, coconut, seeds, or dried fruit; popcorn, chips of any kind including potato and taco chips; jam, marmalade    4877-2014 [employer]. [address] [place] [address], [place] [address]. All rights reserved. This information is not intended as a substitute for professional medical care. Always follow your healthcare professional's instructions.              Pending  "Results     No orders found for last 3 day(s).            Statement of Approval     Ordered          17 0936  I have reviewed and agree with all the recommendations and orders detailed in this document.  EFFECTIVE NOW     Approved and electronically signed by:  Edward Trammell PA-C             Admission Information     Date & Time Provider Department Dept. Phone    3/24/2017 Miroslava Syed DO [facility] [phone]      Your Vitals Were     Blood Pressure Temperature Respirations Height Weight Pulse Oximetry    104/63 (BP Location: Right arm) 97.5  F (36.4  C) (Oral) 16 1.727 m (5' 8\") 78.7 kg (173 lb 6.4 oz) 97%    BMI (Body Mass Index)                   26.37 kg/m2           [employer] Information     [employer] lets you send messages to your doctor, view your test results, renew your prescriptions, schedule appointments and more. To sign up, go to [email or web address] . Click on \"Log in\" on the left side of the screen, which will take you to the Welcome page. Then click on \"Sign up Now\" on the right side of the page.     You will be asked to enter the access code listed below, as well as some personal information. Please follow the directions to create your username and password.     Your access code is: [email or web address]  Expires: 2017 10:26 AM     Your access code will  in 90 days. If you need help or a new code, please call your [place] clinic or [phone].        Care EveryWhere ID     This is your Care EveryWhere ID. This could be used by other organizations to access your [place] medical records  [id number]           Review of your medicines      START taking        Dose / Directions    acetaminophen 325 MG tablet   Commonly known as:  TYLENOL   Used for:  Laceration of oral cavity, initial encounter, Lip laceration, initial encounter, Closed fracture of nasal bone, initial encounter, Contusion of left shoulder, initial encounter        Dose:  650 mg "   Take 2 tablets (650 mg) by mouth every 4 hours as needed for mild pain   Quantity:  100 tablet   Refills:  0       amoxicillin-clavulanate 875-125 MG per tablet   Commonly known as:  AUGMENTIN   Used for:  Laceration of oral cavity, initial encounter, Lip laceration, initial encounter, Closed fracture of nasal bone, initial encounter        Dose:  1 tablet   Take 1 tablet by mouth 2 times daily for 10 days   Quantity:  20 tablet   Refills:  0       cyclobenzaprine 10 MG tablet   Commonly known as:  FLEXERIL   Used for:  Laceration of oral cavity, initial encounter, Lip laceration, initial encounter, Closed fracture of nasal bone, initial encounter, Contusion of left shoulder, initial encounter        Dose:  10 mg   Take 1 tablet (10 mg) by mouth 3 times daily as needed for muscle spasms   Quantity:  60 tablet   Refills:  0       traMADol 50 MG tablet   Commonly known as:  ULTRAM   Used for:  Closed fracture of nasal bone, initial encounter, Lip laceration, initial encounter, Laceration of oral cavity, initial encounter, Contusion of left shoulder, initial encounter        Dose:  50 mg   Take 1 tablet (50 mg) by mouth every 6 hours as needed   Quantity:  25 tablet   Refills:  0            Where to get your medicines      These medications were sent to [facility] [place], [place] - [address]  [address] [place] [address], [place] [address]     Phone:  [phone]     amoxicillin-clavulanate 875-125 MG per tablet    cyclobenzaprine 10 MG tablet         Some of these will need a paper prescription and others can be bought over the counter. Ask your nurse if you have questions.     Bring a paper prescription for each of these medications     traMADol 50 MG tablet       You don't need a prescription for these medications     acetaminophen 325 MG tablet                Protect others around you: Learn how to safely use, store and throw away your medicines at www.disposemymeds.org.             Medication  List: This is a list of all your medications and when to take them. Check marks below indicate your daily home schedule. Keep this list as a reference.      Medications           Morning Afternoon Evening Bedtime As Needed    acetaminophen 325 MG tablet   Commonly known as:  TYLENOL   Take 2 tablets (650 mg) by mouth every 4 hours as needed for mild pain   Last time this was given:  650 mg on 3/25/2017 10:30 AM                                amoxicillin-clavulanate 875-125 MG per tablet   Commonly known as:  AUGMENTIN   Take 1 tablet by mouth 2 times daily for 10 days                                cyclobenzaprine 10 MG tablet   Commonly known as:  FLEXERIL   Take 1 tablet (10 mg) by mouth 3 times daily as needed for muscle spasms   Last time this was given:  10 mg on 3/24/2017  9:35 PM                                traMADol 50 MG tablet   Commonly known as:  ULTRAM   Take 1 tablet (50 mg) by mouth every 6 hours as needed   Last time this was given:  50 mg on 3/25/2017  7:37 AM

## 2017-03-25 VITALS
SYSTOLIC BLOOD PRESSURE: 104 MMHG | RESPIRATION RATE: 16 BRPM | OXYGEN SATURATION: 97 % | BODY MASS INDEX: 26.28 KG/M2 | DIASTOLIC BLOOD PRESSURE: 63 MMHG | WEIGHT: 173.4 LBS | HEIGHT: 68 IN | TEMPERATURE: 97.5 F

## 2017-03-25 LAB
ERYTHROCYTE [DISTWIDTH] IN BLOOD BY AUTOMATED COUNT: 13.5 % (ref 10–15)
HCT VFR BLD AUTO: 41.6 % (ref 40–53)
HGB BLD-MCNC: 14 G/DL (ref 13.3–17.7)
MCH RBC QN AUTO: 30.1 PG (ref 26.5–33)
MCHC RBC AUTO-ENTMCNC: 33.7 G/DL (ref 31.5–36.5)
MCV RBC AUTO: 90 FL (ref 78–100)
PLATELET # BLD AUTO: 190 10E9/L (ref 150–450)
RBC # BLD AUTO: 4.65 10E12/L (ref 4.4–5.9)
WBC # BLD AUTO: 15.3 10E9/L (ref 4–11)

## 2017-03-25 PROCEDURE — 99217 ZZC OBSERVATION CARE DISCHARGE: CPT | Performed by: PHYSICIAN ASSISTANT

## 2017-03-25 PROCEDURE — 85027 COMPLETE CBC AUTOMATED: CPT | Performed by: PHYSICIAN ASSISTANT

## 2017-03-25 PROCEDURE — 25000128 H RX IP 250 OP 636: Performed by: PHYSICIAN ASSISTANT

## 2017-03-25 PROCEDURE — 25000132 ZZH RX MED GY IP 250 OP 250 PS 637: Performed by: PHYSICIAN ASSISTANT

## 2017-03-25 PROCEDURE — G0378 HOSPITAL OBSERVATION PER HR: HCPCS

## 2017-03-25 PROCEDURE — 36415 COLL VENOUS BLD VENIPUNCTURE: CPT | Performed by: PHYSICIAN ASSISTANT

## 2017-03-25 RX ORDER — CYCLOBENZAPRINE HCL 10 MG
10 TABLET ORAL 3 TIMES DAILY PRN
Qty: 60 TABLET | Refills: 0 | Status: SHIPPED | OUTPATIENT
Start: 2017-03-25

## 2017-03-25 RX ORDER — TRAMADOL HYDROCHLORIDE 50 MG/1
50 TABLET ORAL EVERY 6 HOURS PRN
Qty: 25 TABLET | Refills: 0 | Status: SHIPPED | OUTPATIENT
Start: 2017-03-25

## 2017-03-25 RX ORDER — ACETAMINOPHEN 325 MG/1
650 TABLET ORAL EVERY 4 HOURS PRN
Qty: 100 TABLET | Refills: 0 | COMMUNITY
Start: 2017-03-25

## 2017-03-25 RX ADMIN — TRAMADOL HYDROCHLORIDE 50 MG: 50 TABLET, COATED ORAL at 07:37

## 2017-03-25 RX ADMIN — ACETAMINOPHEN 650 MG: 325 TABLET, FILM COATED ORAL at 10:30

## 2017-03-25 RX ADMIN — CEFAZOLIN SODIUM 1 G: 1 INJECTION, POWDER, FOR SOLUTION INTRAMUSCULAR; INTRAVENOUS at 05:37

## 2017-03-25 RX ADMIN — HYDROMORPHONE HYDROCHLORIDE 0.2 MG: 1 INJECTION, SOLUTION INTRAMUSCULAR; INTRAVENOUS; SUBCUTANEOUS at 07:35

## 2017-03-25 NOTE — PROGRESS NOTES
D/C pharmacy will speak w/ pt: Needing more info on pt's employer to process D/C Meds thru Workman's comp. Called wife and wife will call D/C pharmacy Directly to assist w/ having meds filled prior to d/c.    Orquidea Shine Care Transitions Nurse,  RN, BSN, [facility]  Cell Phone # [phone]

## 2017-03-25 NOTE — PROGRESS NOTES
ORAL AND MAXILLOFACIAL SURGERY PROGRESS NOTE    Patrick was seen on AM rounds, resting comfortably in bed, eating breakfast, wife at bedside.  He is POD #1 s/p debridement and primary closure of a right lower lip laceration.  Patient also has stable nasal bone fracture that does not require intervention.  He reports no acute issues overnight.  Pain is controlled.  Patient is tolerating po diet.      Maxillofacial Exam:  Abrasions hemostatic and clean  Nasal dorsum displaced slightly to the left, stable  Lower lip laceration is closed primarily, hemostatic and clean  Sutures are intact  No signs of acute postoperative infection    Assessment/Plan: Good progress for POD #1.  Anticipate pain and swelling to lower lip to continue 1-2 days and then improve.  Patient may be DC to home as per primary team.  Continue antibiotics while admitted and DC to home with course of Keflex or Augmentin for coverage of skin and intraoral terrell.  Patient will follow up with Dr. Weldon on Wednesday.  Please page OMS for concerns.    Mauricio Borja DDS

## 2017-03-25 NOTE — H&P
PRIMARY CARE PROVIDER:  [facility].      CHIEF COMPLAINT:  Facial lacerations.        HISTORY OF PRESENT ILLNESS:  Patrick Cloud is a 47-year-old male with a significant past medical history who presented to the Emergency Department this morning for evaluation of facial trauma.  He was at work when a piece of plywood fell 10 feet and hit him in the face.  He sustained multiple facial lacerations and presented to the Emergency Department.  Workup in the Emergency Department revealed a moderately displaced fracture of the nasal bone as well as multiple facial lacerations.  Oral Maxillofacial Surgery was consulted and he was taken to the OR for facial and lip laceration repair.  Hospitalist Service was asked to admit the patient postoperatively.      Presently, the patient is evaluated in his hospital room.  He offers few complaints at this time.  He is tolerating a full liquid diet.  He has had some progressive neck pain since the incident.  Denies any radicular symptoms.  Has full range of motion of neck.  He was wearing a hard hat.  He did not lose consciousness.      PAST MEDICAL HISTORY:  None.       PRIOR TO ADMISSION MEDICATIONS:  None.      SURGICAL HISTORY:  Nose fracture.      FAMILY HISTORY:  Heart disease.      SOCIAL HISTORY:  He is a nonsmoker, drinks minimal alcohol.  Is .        REVIEW OF SYSTEMS:  A 10-point review of systems was completed.  Pertinent positives are noted in HPI otherwise systems negative.      PHYSICAL EXAMINATION:   GENERAL:  This is a well-developed, well-nourished 47-year-old male who appears younger than his stated age.   VITAL SIGNS:  Blood pressure is 124/74, heart rate 78, respiration 16, O2 sat 96% on room air.   HEENT:  Normocephalic.  Multiple facial and lip lacerations noted.  Significant swelling to the oral mucosa as well as deformity of the nasal bones.   NECK:  Supple, full active range of motion.  No midline tenderness.   CARDIOVASCULAR:  Regular  rate and rhythm.  No murmurs.   PULMONARY:  Normal effort.  Clear to auscultation bilaterally.   ABDOMEN:  Nondistended.   EXTREMITIES:  Moves all 4 extremities.  Dorsalis pedis and radial pulses are palpated bilaterally.   NEUROLOGIC:  Alert and oriented.  Cranial nerves II-XII are intact.  Strength is 5/5 and symmetric in the upper extremities.  Radial pulses are palpated.      LABORATORY EVALUATION:  As reviewed in Epic.      ASSESSMENT:  Patrick Cloud is a 47-year-old male with no significant past medical history who was admitted following surgical repair of multiple facial and oral lacerations as well as wound debridement.   1.  Facial trauma with multiple facial lacerations, status post surgical debridement and repair.  The patient will be admitted under observation.  I have discussed with case with Dr. Weldon of Oral Maxillofacial Surgery.  He would like the patient initiated on Ancef 1 g q.8 h.  The patient will be on a full liquid diet.  He recommended pain control with acetaminophen and tramadol.  Zofran will also be available.  Warm water swish and spit every 2 hours as needed.  Dr. Weldon did tell me that his partner will see the patient tomorrow prior to discharge.   2.  Neck pain.  The patient did have significant trauma plywood hitting his head.  He has had progressive neck pain into this evening.  He has full range of motion of his neck and no radicular symptoms.  He was wearing a hard hat.  Given significant trauma, will obtain a CT of the cervical spine to rule out fracture or malalignment.  In the interim will continue tramadol, Tylenol as well as Flexeril available for pain.   3.  Displaced nasal bone fracture.  The patient to follow up with ENT as an outpatient.   4.  Deep venous thrombosis prophylaxis.  Ambulation.      CODE STATUS:  Full code.      The patient was discussed with Dr. Miroslava Syed of the Hospitalist Service, who independently interviewed the patient.  She is agreeable with the plan.          LUCIA RODRÍGUEZ, DO       As dictated by SABAS MCCOY PA-C            D: 2017 20:23   T: 2017 21:20   MT: MAYANK      Name:     RICKI ERVIN   MRN:      [id number]        Account:      [id number]   :      1970           Admitted:     [id number]      Document: [id number]

## 2017-03-25 NOTE — PROGRESS NOTES
Pt and wife stated they will call dr Weldon office on Monday to make the appointment. They have phone numbers and will call for appointment

## 2017-03-25 NOTE — PLAN OF CARE
Problem: Discharge Planning  Goal: Discharge Planning (Adult, OB, Behavioral, Peds)  Outcome: No Change  Patient returned from surgery today. A&Ox4, he had laceration repair of his upper and lower lip. He complains of neck pain and was given IV dilaudid, Ultram , Flexeril and Tylenol which controlled his pain. He also complains of chin pain. CT of neck was negative. Ivf infusing and tolerating full liquid diet. SBA to the bathroom.

## 2017-03-25 NOTE — PLAN OF CARE
Problem: Goal Outcome Summary  Goal: Goal Outcome Summary  Outcome: Adequate for Discharge Date Met:  03/25/17  Pt doing well. A/o. VSS. Pain controlled. Up adlib. Tolerating full liquid diet. Voiding. Seen by MD. Torres to dc. Discharge instructions reviewed with pt. Verbalizes understanding. PIV removed. Take home meds given. Copy signed. Verified 5 medication rights. Work note give. Pt will f/u with dr Weldon on Wednesday. All questions answered. Dc home with wife.

## 2017-03-25 NOTE — PLAN OF CARE
Problem: Discharge Planning  Goal: Discharge Planning (Adult, OB, Behavioral, Peds)  Outpatient/Observation goals to be met before discharge home:     -diagnostic tests and consults completed and resulted: MET   -vital signs normal or at patient baseline : MET  Nurse to notify provider when observation goals have been met and patient is ready for discharge.

## 2017-03-25 NOTE — PLAN OF CARE
Problem: Goal Outcome Summary  Goal: Goal Outcome Summary   A&Ox4, up IND. POD1 of a facial laceration repair of his upper and lower lip. Sutures intact. No drainage noted. He complains of neck pain, CT negative per MD note. Ice packs applied to facial areas and HOB elevated for comfort. PRN Tramadol, Dilaudid, Flexeril, and Tylenol.  Ivf infusing and tolerating full liquid diet.

## 2017-03-25 NOTE — ANESTHESIA POSTPROCEDURE EVALUATION
Patient: Patrick Cloud    Procedure(s):  REPAIR OF LIP LACERATIONS AND FACIAL LACERATIONS; EXAM UNDER ANESTHESIA ORAL - Wound Class: II-Clean Contaminated   - Wound Class: II-Clean Contaminated    Diagnosis:Facial lacerations  Diagnosis Additional Information: No value filed.    Anesthesia Type:  General, ETT, RSI    Note:  Anesthesia Post Evaluation    Patient location during evaluation: PACU  Patient participation: Able to fully participate in evaluation  Level of consciousness: responsive to verbal stimuli and sleepy but conscious  Pain management: adequate  Airway patency: patent  Cardiovascular status: acceptable  Respiratory status: acceptable  Hydration status: acceptable  PONV: none     Anesthetic complications: None    Comments: Stable, doing well in PACU prior to ttf.  No airway/respiratory compromise.         Last vitals:  Vitals:    03/24/17 1924 03/24/17 2000 03/24/17 2022   BP:  124/74    Resp: 19 16 16   Temp:  36.9  C (98.5  F)    SpO2: 96% 96% 96%         Electronically Signed By: Mick Wakefield MD  March 24, 2017  8:49 PM

## 2017-03-25 NOTE — DISCHARGE INSTRUCTIONS
Full Liquid Diet  A full liquid diet is a middle step between a clear liquid diet and eating solid foods. A clear liquid diet allows only liquid you can see through. A full liquid diet allows thicker, liquid foods as listed below. The full liquid diet may be used before or after surgery or before some medical tests. It is easy to digest and leaves little food in the stomach and intestines.  A full liquid diet meets calorie and protein needs for your body with liquids only. It should not be used for more than 5 days. Your doctor or dietitian may also order high-protein, high-calorie liquid supplements for extra vitamins and minerals. You may include the following items on a full liquid diet:    Adults  Adults should drink a total of 2 to 3 quarts of liquid per day. It may be easier to drink small, frequent servings rather than a few large ones. Patients with severe kidney or heart disease can drink only limited amounts of fluids. Check with your doctor.    Beverages. Coffee, tea, cream, milk, milkshakes, fruit and vegetable juices, sodas, mineral water (plain or flavored), liquid gelatin, electrolyte replacement sport drinks.    Cereals and soups. Creamy, hot breakfast cereals (wheat or rice), pureed soups (including pureed meats, bland vegetables, and white potatoes), tomato puree.    Desserts. Gelatin, whipped topping, custard-style yogurt, pudding, custard, plain ice cream, sherbet, sorbet, popsicles, fruit juice bars.    Miscellaneous. Salt, mild-flavored seasonings, chocolate flavoring, gravy, margarine, sugar, syrup, jelly, honey, hard candy (to suck on).  Children  Follow your health care provider s instructions. Young children who are eating solid foods may tolerate the items listed above. Here are some precautions:    Don't give hard candies to young children. The candies may cause choking.    Children under 1 year old. Do not give honey. It may cause illness.    Children under 2 years old. Ask the doctor  if you should supplement your child s diet with oral rehydration solutions, which contain electrolytes. Oral rehydration solution is available at drugstores and grocery stores without a prescription.    Children over 1 year old. Limit milk to 3 or 4 cups per day. Too much milk can decrease your child s appetite for other foods.    5017-1739 [employer] [address] [place] [address]. All rights reserved. This information is not intended as a substitute for professional medical care. Always follow your healthcare professional's instructions.        Soft Diet  Your health care provider has prescribed a soft diet (also called gastrointestinal soft diet). This means eating foods that are soft in texture, low in fiber, and easily digested. This diet is for people with digestive problems. A soft diet provides foods that are easy to chew and swallow. Foods should be bite-size and very soft or moist so they are easy to swallow. Follow any specific instructions from your health care provider about foods and beverages you can and cannot have. The general guidelines below can help you get started on this diet.       Beverages  OK: Milk, tea, coffee, fruit juices, carbonated beverages, nutrition shakes and drinks (Note: Thin liquids might be difficult to swallow and may require thickening.)   Avoid: None, except if they need to be thickened  Breads and crackers  OK: Refined white, wheat, or seedless rye bread, maik or soda crackers that have been moistened, plain rolls or bagels, very soft tortillas  Avoid: Whole-grain breads, rolls, or bagels with nuts, raisins, or seeds, crackers, croutons, taco shells  Cereals and grains  OK: Cooked cereals, plain dry cereals that have been moistened, plain macaroni, spaghetti, noodles, rice  Avoid: Whole-grain cereals and granola, or cereals containing bran, raisins, seeds or nuts, coconut; brown or wild rice  Fruits  OK: Avocado, banana, baked peeled apple,  applesauce, peeled ripe peaches or pears, canned fruit (apricots, cherries, peaches, pears), melons  Avoid: Raw apple, dried fruits, coconut, pineapple, grapes, fruit lizzie, fruit snacks  Meat and fish  OK: All fresh meat, poultry, or fish that is cooked until tender  Avoid: Meat, fish, or poultry that is fried; tough or stringy meat including de león, sausage, bratwurst, jerky, corned beef  Eggs and cheese  OK: Poached or scrambled eggs, cottage cheese, ricotta cheese, cream cheese, cheese sauces, or cheese melted in other dishes  Avoid: Crisp fried eggs, cheese slices and cubes  Other protein foods  OK: Tofu, baked beans, smooth peanut butter or other nut or seed butters  Avoid: Deep-fried tofu, crunchy peanut or other nut or seed butters, nuts or seeds that are whole or chopped  Soups  OK: All soups, but they may need to be thickened if the thin liquid is too difficult to swallow  Avoid: Soups made with stringy meat pieces or chunky vegetables  Vegetables  OK: Peeled and well-cooked potatoes or sweet potatoes; fresh, cooked, canned, or frozen vegetables without seeds, skin, or coarse fiber  Avoid: Raw vegetables, deep-fried vegetables (such as tempura); corn  Desserts and sweets  OK: Moist cake, soft fruit pie with bottom crust only, soft cookies moistened in milk or other liquid, gelatin, custard, pudding, plain ice cream, plain sherbet, sugar, honey, clear jelly  Avoid: Pastries, desserts, and ice cream that have nuts, coconut, seeds, or dried fruit; popcorn, chips of any kind including potato and taco chips; jam, marmalade    8733-4218 [employer] [address] [place] [address], [place] [address]. All rights reserved. This information is not intended as a substitute for professional medical care. Always follow your healthcare professional's instructions.

## 2017-03-25 NOTE — DISCHARGE SUMMARY
PRIMARY CARE PROVIDER:  The patient is seen at [facility].        DATE OF ADMISSION:  03/24/2017.      DATE OF DISCHARGE:  03/25/2017.      DISCHARGE DIAGNOSES:   1.  Facial trauma with multiple facial lacerations, status post surgical debridement and repair.   2.  Neck pain.   3.  Displaced nasal bone fracture.      DISCHARGE MEDICATIONS:       Review of your medicines      START taking       Dose / Directions    acetaminophen 325 MG tablet   Commonly known as:  TYLENOL   Used for:  Laceration of oral cavity, initial encounter, Lip laceration, initial encounter, Closed fracture of nasal bone, initial encounter, Contusion of left shoulder, initial encounter        Dose:  650 mg   Take 2 tablets (650 mg) by mouth every 4 hours as needed for mild pain   Quantity:  100 tablet   Refills:  0       amoxicillin-clavulanate 875-125 MG per tablet   Commonly known as:  AUGMENTIN   Used for:  Laceration of oral cavity, initial encounter, Lip laceration, initial encounter, Closed fracture of nasal bone, initial encounter        Dose:  1 tablet   Take 1 tablet by mouth 2 times daily for 10 days   Quantity:  20 tablet   Refills:  0       cyclobenzaprine 10 MG tablet   Commonly known as:  FLEXERIL   Used for:  Laceration of oral cavity, initial encounter, Lip laceration, initial encounter, Closed fracture of nasal bone, initial encounter, Contusion of left shoulder, initial encounter        Dose:  10 mg   Take 1 tablet (10 mg) by mouth 3 times daily as needed for muscle spasms   Quantity:  60 tablet   Refills:  0       traMADol 50 MG tablet   Commonly known as:  ULTRAM   Used for:  Closed fracture of nasal bone, initial encounter, Lip laceration, initial encounter, Laceration of oral cavity, initial encounter, Contusion of left shoulder, initial encounter        Dose:  50 mg   Take 1 tablet (50 mg) by mouth every 6 hours as needed   Quantity:  25 tablet   Refills:  0            Where to get your medicines      These  medications were sent to [facility] [place], [place] - [address]  [address] [place] [address]     Phone:  [phone]      amoxicillin-clavulanate 875-125 MG per tablet     cyclobenzaprine 10 MG tablet         Some of these will need a paper prescription and others can be bought over the counter. Ask your nurse if you have questions.     Bring a paper prescription for each of these medications      traMADol 50 MG tablet       You don't need a prescription for these medications      acetaminophen 325 MG tablet              DISPOSITION:  Home.      ACTIVITY:  As tolerated.  Recommend no driving while taking narcotics or Flexeril and a work note has been provided to the patient and he should not return to work until Wednesday or Thursday of next week.      DIET:  Recommend a full liquid diet for 2-3 days, then start mechanical soft diet.      FOLLOW UP:  Follow up with recommendations.   1.  Recommend follow up with primary care provider within 1 week for evaluation of medication changes and hospital followup and to set up a referral for ENT.   2.  Patient has an appointment scheduled to follow up with Oral Maxillofacial Surgery on Wednesday of next week.      CONSULTS:  Oral Maxillofacial Surgery.      LABORATORY, IMAGING AND PROCEDURES:   1.  Routine laboratory studies included basic metabolic panel, CBC with platelets, x2.   2.  3-view left shoulder x-ray.   3.  Head CT without contrast.   4.  Maxillofacial CT without contrast.   5.  Cervical spine CT without contrast.   6.  Repair of lip laceration and facial laceration with debridement.      PENDING RESULTS:  None.      PHYSICAL EXAMINATION:   VITAL SIGNS:  Temperature is 97.5 degrees Fahrenheit with a blood pressure of 104/63, heart rate of 56 beats per minute, respiratory rate of 16, O2 saturation 97% on room air.  Patient was rating her pain at 3/10.   GENERAL:  The patient is awake, alert, cooperative, in no apparent  distress, though does appear to be in pain occasionally with grimacing.   HEENT:  Multiple facial abrasions, lacerations with ecchymotic change within his face.  Moist mucous membranes present.  No exudates noted in the posterior pharynx.   NECK:  Supple, normal range of motion, no tracheal deviation, no cervical lymphadenopathy present.   CARDIOVASCULAR:  Regular rate and rhythm, no rubs, murmurs or gallops appreciated.   PULMONARY:  Lungs are clear to auscultation bilaterally, no wheezes, rhonchi or rales appreciated.   GASTROINTESTINAL:  Bowel sounds are present in all 4 quadrants, soft, nontender, nondistended.   NEUROLOGIC:  Cranial nerves II-XII appear grossly intact.  The patient is seen moving all 4 extremities without any difficulty and ambulating without ataxia.   EXTREMITIES:  No lower extremity edema noted bilaterally.  Calves are nontender to palpation.      BRIEF HISTORY OF PRESENT ILLNESS:  Patrick Cloud is a 47-year-old male with no significant past medical history who is registered to observation on 03/24/2017 to undergo a surgical repair of multiple facial and oral lacerations as well as wound debridement following  facial trauma.      For full details, please read H&P as dictated by physician assistant, Mary Kate Graves.      HOSPITAL COURSE:   1.  Facial trauma with multiple facial lacerations and oral lacerations, status post surgical debridement and repair:  Patient was assessed by Oral Maxillofacial Surgery and underwent surgical debridement and repair.  He received 2 doses of IV Ancef and placed on a full liquid diet.  The patient was assessed on day of discharge via Oral Maxillofacial Surgery with recommendations that patient could discharge home with initiation of oral antibiotics and followup next week.  The patient will discharge on a 10-day course of Augmentin, several days of full liquid diet then transitioned to a mechanical soft diet.  Pain was managed with oral Tylenol and oral Ultram  with a prescription being provided for both.  The patient has received a work note as well.   2.  Neck pain:  The patient had noted significant trauma after plywood struck him in the head from a 10 feet height.  He developed progressive neck pain throughout the evening and into the day of discharge.  The patient denied particular symptoms and had full range of motion.  Cervical spine CT was negative for fracture or malalignment and the pain was managed with Ultram, Tylenol and Flexeril for which patient will have a prescription for all of these.   3.  Displaced nasal bone fracture:  Recommend patient follow up with primary care provider and get a referral for ENT as an outpatient but this will have to likely wait for healing of facial lacerations and trauma.      CODE STATUS:  Full code.      The patient was discussed with Dr. Bazzi who agrees with discharge at this time.  Dr. Bazzi will evaluate the patient independently.      Total discharge time less than 30 minutes.         NILAM BAZZI MD       As dictated by ELLA DIALLO PA-C            D: 2017 13:50   T: 2017 14:52   MT: MAYANK      Name:     RICKI ERVIN   MRN:      [id number]        Account:        [id number]   :      1970           Admit Date:     [id number]                                  Discharge Date: 2017      Document: [id number]       cc: [facility]

## 2017-03-25 NOTE — OP NOTE
"DATE OF PROCEDURE:  3/24/2017      PREOPERATIVE DIAGNOSIS:  Facial lacerations consistent with infranasal, right upper lip, right lower lip, degloving intraoral incisional lower lip.        POSTOPERATIVE DIAGNOSIS:  Facial lacerations consistent with infranasal, right upper lip, right lower lip, degloving intraoral incisional lower lip with surgical correction.        PROCEDURE:  Repair of lacerations and debridement of wounds.      INDICATIONS FOR OPERATION:  Patrick Cloud is a 47-year-old male who was struck with plywood while at a construction site today.  He sustained a fractured nose and lacerations as described above.  He did not lose consciousness.  He was taken to the emergency room at [facility].  I was called to evaluate him.  He did not sustain a significant head trauma nor did he have any maxillofacial fractures other than nose.  The plan will be to take him to the operating room and under general anesthetic debride the \"dirty\" wounds and repair them in the usual way.  The patient has received a preoperative history and physical examination by the emergency room doctor.      OPERATIVE PROCEDURE:  The patient was taken to the operating room in good condition.  He was given atraumatic orotracheal intubation and induction and was prepped and draped in the usual fashion for this procedure.  He was shaved in the mustache and beard region.  A throat pack was placed.  A timeout was accomplished in a systematic fashion.  Each laceration was assessed and cleansed very thoroughly with suction Q-tips and pickups.  Small amounts of debris were removed from the nose laceration and the upper lip laceration, larger amounts of gravelish debris were removed from the degloving incision wound in the intraoral chin area-labial vestibule.  Once these wounds were cleansed very, very thoroughly and irrigated they were repaired first in layers intraorally and then the lip laceration on the lower right lip was " prepared very nicely in layers utilizing Vicryl and 6-0 nylon.  The upper lip laceration was repaired very effectively with Vicryl and the infranasal laceration after thorough debridement was repaired with Vicryl deep and fast absorbing gut areas were cleansed very thoroughly.  Benzoin and Steri-Strips were placed.  Laceration repairs looked quite well.  Tonsillar suction was utilized.  The throat pack was removed.  The throat was suctioned.  Incidentally, it should be stated that there was no dental trauma seen nor any other oral lacerations.  There was a small skin laceration on the right lateral nose which did not require sutures.  We will admit the patient tonight.         LISHA BACON DDS             D: 2017 18:29   T: 2017 15:30   MT: [id number]      Name:     RICKI ERVIN   MRN:      [id number]        Account:        [id number]   :      1970           Procedure Date: 2017      Document: [id number]

## 2017-03-25 NOTE — CONSULTS
I was called by Dr. Caldera in the emergency room earlier this afternoon to ask my opinion about Patrick Cloud.        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  A piece of plywood fell on him and he was knocked down.  He did not lose consciousness apparently and he was brought to the emergency room at [facility].  Please see Dr. Caldera's history and physical examination, oral and maxillofacial exam.        ORAL AND MAXILLOFACIAL EXAMINATION:     HEENT:  PERRLA with minimal responsiveness because of Dilaudid.  EOM intact.  Cranial nerves II-XII intact except mild right V3 paresthesia at the site of the lip laceration.  Infraorbital rims, zygomatic arches, zygomaticofrontal sutures, maxilla and mandible are all intact.  Nasal bone has a fracture that has displaced his nose to the left.  He has a contusion on his forehead, a very superficial laceration on the right lateral nose and more profound lacerations at the inferior aspect of his nose (horizontally 2 cm), upper lip through and through and gaping open laceration on the lower lip with a degloving wound intraorally.  He has a full mustache and beard.  INTRAORAL:  His occlusion is stable.  His maxilla and mandible are stable.  All of his teeth are stable.  There is no sublingual ecchymosis.  No intraoral lacerations.  With a cursory look into the degloving incision, there palpably is cement debris within the wound.        RADIOLOGY:  The CT of the maxilla and mandible show no fractures except in the nasal bone.      ASSESSMENT:   1.  Nasal fracture.   2.  Lacerations as described above.        PLAN:     1.  Our plan will be to take him to the operating room and repair the lacerations and debride the wounds very well.     2.  Secondarily, we will probably have him stay overnight for observation as his neck pain is increasing.   3.  We will have him see ear, nose and  throat for his nasal fracture next week.         LISHA BACON DDS             D: 2017 18:03   T: 2017 20:02   MT: MAYANK      Name:     RICKI ERVIN   MRN:      [id number]        Account:       [id number]   :      1970           Consult Date:  2017      Document: [id number]

## 2019-09-28 ENCOUNTER — HEALTH MAINTENANCE LETTER (OUTPATIENT)
Age: 49
End: 2019-09-28

## 2021-01-10 ENCOUNTER — HEALTH MAINTENANCE LETTER (OUTPATIENT)
Age: 51
End: 2021-01-10

## 2021-10-23 ENCOUNTER — HEALTH MAINTENANCE LETTER (OUTPATIENT)
Age: 51
End: 2021-10-23

## 2022-02-12 ENCOUNTER — HEALTH MAINTENANCE LETTER (OUTPATIENT)
Age: 52
End: 2022-02-12

## 2022-10-09 ENCOUNTER — HEALTH MAINTENANCE LETTER (OUTPATIENT)
Age: 52
End: 2022-10-09

## 2023-02-18 ENCOUNTER — HEALTH MAINTENANCE LETTER (OUTPATIENT)
Age: 53
End: 2023-02-18

## 2024-03-16 ENCOUNTER — HEALTH MAINTENANCE LETTER (OUTPATIENT)
Age: 54
End: 2024-03-16

## (undated) DEVICE — PACK HEAD NECK SEN15HNFSF

## (undated) DEVICE — SU ETHILON 6-0 P-3 18" BLACK 1698G

## (undated) DEVICE — SPONGE PACK VAGINAL 2X36"

## (undated) DEVICE — SOL NACL 0.9% IRRIG 1000ML BOTTLE 07138-09

## (undated) DEVICE — GLOVE PROTEXIS W/NEU-THERA 8.0  2D73TE80

## (undated) DEVICE — SU PLAIN FAST ABSORB 5-0 PC-1 18" 1915G

## (undated) DEVICE — SOL ADH LIQUID BENZOIN SWAB 0.6ML C1544

## (undated) DEVICE — SU VICRYL 4-0 PS-2 18" UND J496H

## (undated) DEVICE — ESU GROUND PAD UNIVERSAL W/O CORD

## (undated) DEVICE — SUCTION CANISTER MEDIVAC LINER 3000ML W/LID 65651-530

## (undated) DEVICE — SPECIMEN CULTURETTE DBL SWAB 220109

## (undated) DEVICE — DRSG STERI STRIP 1/4X3" R1541

## (undated) DEVICE — SU VICRYL 5-0 PS-3 18" UND J500G

## (undated) DEVICE — LINEN TOWEL PACK X5 5464

## (undated) RX ORDER — FENTANYL CITRATE 50 UG/ML
INJECTION, SOLUTION INTRAMUSCULAR; INTRAVENOUS
Status: DISPENSED
Start: 2017-03-24

## (undated) RX ORDER — BUPIVACAINE HYDROCHLORIDE AND EPINEPHRINE 5; 5 MG/ML; UG/ML
INJECTION, SOLUTION EPIDURAL; INTRACAUDAL; PERINEURAL
Status: DISPENSED
Start: 2017-03-24

## (undated) RX ORDER — LIDOCAINE HCL/EPINEPHRINE/PF 2%-1:200K
VIAL (ML) INJECTION
Status: DISPENSED
Start: 2017-03-24

## (undated) RX ORDER — LIDOCAINE HYDROCHLORIDE AND EPINEPHRINE BITARTRATE 20; .01 MG/ML; MG/ML
INJECTION, SOLUTION SUBCUTANEOUS
Status: DISPENSED
Start: 2017-03-24

## (undated) RX ORDER — DEXAMETHASONE SODIUM PHOSPHATE 4 MG/ML
INJECTION, SOLUTION INTRA-ARTICULAR; INTRALESIONAL; INTRAMUSCULAR; INTRAVENOUS; SOFT TISSUE
Status: DISPENSED
Start: 2017-03-24

## (undated) RX ORDER — HYDROMORPHONE HYDROCHLORIDE 1 MG/ML
INJECTION, SOLUTION INTRAMUSCULAR; INTRAVENOUS; SUBCUTANEOUS
Status: DISPENSED
Start: 2017-03-24